# Patient Record
Sex: MALE | Race: WHITE | HISPANIC OR LATINO | Employment: FULL TIME | URBAN - METROPOLITAN AREA
[De-identification: names, ages, dates, MRNs, and addresses within clinical notes are randomized per-mention and may not be internally consistent; named-entity substitution may affect disease eponyms.]

---
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
## 2024-05-27 ENCOUNTER — HOSPITAL ENCOUNTER (EMERGENCY)
Facility: CLINIC | Age: 24
Discharge: HOME | End: 2024-05-27
Attending: NURSE PRACTITIONER | Admitting: NURSE PRACTITIONER

## 2024-05-27 VITALS
TEMPERATURE: 97.9 F | SYSTOLIC BLOOD PRESSURE: 117 MMHG | OXYGEN SATURATION: 99 % | RESPIRATION RATE: 20 BRPM | DIASTOLIC BLOOD PRESSURE: 65 MMHG | HEART RATE: 65 BPM

## 2024-05-27 DIAGNOSIS — H10.32 ACUTE CONJUNCTIVITIS OF LEFT EYE, UNSPECIFIED ACUTE CONJUNCTIVITIS TYPE: ICD-10-CM

## 2024-05-27 PROCEDURE — G0463 HOSPITAL OUTPT CLINIC VISIT: HCPCS

## 2024-05-27 PROCEDURE — 99203 OFFICE O/P NEW LOW 30 MIN: CPT | Performed by: NURSE PRACTITIONER

## 2024-05-27 RX ORDER — OFLOXACIN 3 MG/ML
1-2 SOLUTION/ DROPS OPHTHALMIC 4 TIMES DAILY
Qty: 5 ML | Refills: 0 | Status: SHIPPED | OUTPATIENT
Start: 2024-05-27 | End: 2024-06-03

## 2024-05-27 ASSESSMENT — ACTIVITIES OF DAILY LIVING (ADL): ADLS_ACUITY_SCORE: 33

## 2024-05-27 ASSESSMENT — COLUMBIA-SUICIDE SEVERITY RATING SCALE - C-SSRS
2. HAVE YOU ACTUALLY HAD ANY THOUGHTS OF KILLING YOURSELF IN THE PAST MONTH?: NO
1. IN THE PAST MONTH, HAVE YOU WISHED YOU WERE DEAD OR WISHED YOU COULD GO TO SLEEP AND NOT WAKE UP?: NO
6. HAVE YOU EVER DONE ANYTHING, STARTED TO DO ANYTHING, OR PREPARED TO DO ANYTHING TO END YOUR LIFE?: NO

## 2024-05-27 NOTE — ED PROVIDER NOTES
ED Provider Note  [facility]      History     Chief Complaint   Patient presents with    Eye Problem     HPI  Portillo Bradshaw is a 24 year old male who presents with 1 day of left eye itching, irritation, mattering /drainage that began yesterday.  Denies any fever, nasal or respiratory congestion.  Denies sore throat, skin rashes, nausea vomiting or diarrhea.  Unsure if he has had exposure to others that are sick with similar symptoms.  Denies any eye injuries, denies any change in vision or visual acuity.            Allergies:  No Known Allergies    Problem List:    There are no problems to display for this patient.       Past Medical History:    No past medical history on file.    Past Surgical History:    No past surgical history on file.    Family History:    Family History   Problem Relation Age of Onset    Liver Cancer Mother        Social History:  Marital Status:  Single [1]  Social History     Tobacco Use    Smoking status: Never    Smokeless tobacco: Never   Substance Use Topics    Alcohol use: Yes    Drug use: Never        Medications:    ofloxacin (OCUFLOX) 0.3 % ophthalmic solution          Review of Systems  A medically appropriate review of systems was performed with pertinent positives and negatives noted in the HPI, and all other systems negative.    Physical Exam   Patient Vitals for the past 24 hrs:   BP Temp Temp src Pulse Resp SpO2   05/27/24 1337 117/65 97.9  F (36.6  C) Tympanic 65 20 99 %          Physical Exam  General: alert and in no acute distress on arrival  Ears/Nose/Throat: ENT: Left Ear: TM intact, middle ear is not erythremic, no purulence, canal patent. Right Ear: TM intact, middle ear is not erythremic, no purulence, canal patent. Nose: No erythema or edema patent nostrils bilateral. Throat: midline uvula, non-erythremic, non-enlarged tonsils, without exudate.  No cervical adenopathy.   Head: atraumatic, normocephalic, Eyes:  non-traumatic.  Left Eye:  reddened conjunctiva, no icterus, noninjected, normal pupillary response to light. Normal extraocular movement.  Right eye: non-reddened conjunctiva, no icterus, noninjected, normal pupillary response to light. Normal extraocular movement bilateral. No drainage present.   Lungs:  nonlabored, CTA bilateral throughout  CV: Giller rate and rhythm, extremities warm and perfused  Skin: no rashes, no diaphoresis and skin color normal  Neuro: Patient awake, alert, speech is fluent, no focal deficits  Psychiatric: affect/mood normal, appropriate historian.      ED Course                 Procedures                    No results found for this or any previous visit (from the past 24 hour(s)).    MEDICATIONS GIVEN IN THE EMERGENCY DEPARTMENT:  Medications - No data to display             Assessments & Plan (with Medical Decision Making)  24 year old male who presents to the Urgent Care for evaluation of acute conjunctivitis of left eye, unspecified acute conjunctivitis type.  Ordered ofloxacin eyedrops 4 times daily for 7 days recommend finishing the 7-day course of medication.  Advised that if symptoms or not improving with use of ofloxacin a couple days this may be a viral source of conjunctivitis.  Recommended warm packing eyes 3 times daily to promote tear duct drainage decrease inflammation.  Return here if symptoms or not improving despite recommended treatment plan.  Patient also could be seen in ophthalmology if symptoms or not resolving.       I have reviewed the nursing notes.    I have reviewed the findings, diagnosis, plan and need for follow up with the patient.        NEW PRESCRIPTIONS STARTED AT TODAY'S ER VISIT  New Prescriptions    OFLOXACIN (OCUFLOX) 0.3 % OPHTHALMIC SOLUTION    Place 1-2 drops Into the left eye 4 times daily for 7 days       Final diagnoses:   Acute conjunctivitis of left eye, unspecified acute conjunctivitis type       5/27/2024   [facility] EMERGENCY DEPT       Janiya Pena  STEFANIE TERRY CNP  06/12/24 [id number]

## 2024-05-27 NOTE — ED TRIAGE NOTES
Pt declines [facility]  services - has family member ( over age 18) present to interpret, help with communication.     Reports left eye pain, itching and redness , symptom onset yesterday

## 2024-05-27 NOTE — DISCHARGE INSTRUCTIONS
Recommend using eyedrops for treatment of pinkeye 4 times daily for 7 days if symptoms or not improving or if they worsen please return.